# Patient Record
(demographics unavailable — no encounter records)

---

## 2024-11-01 NOTE — PHYSICAL EXAM
[Right] : right shoulder [Left] : left shoulder [Sitting] : sitting [] : no AC joint tenderness [FreeTextEntry9] : IR to lateral hip. [TWNoteComboBox4] : passive forward flexion 150 degrees [TWNoteComboBox6] : internal rotation L2 [de-identified] : external rotation 60 degrees

## 2024-11-01 NOTE — ASSESSMENT
[FreeTextEntry1] : We discussed the underlying pathology. Treatment options reviewed. Medication use discussed. PT is planned. A R SH injection is elected today. Cautions discussed. If sx persist, consider MRI. Questions answered. Follow up 6 weeks.  Patient seen by Dr. Gordy Dill, who determined the assessment and plan Caitie BEE, am scribing for Dr. Gordy Dill in his presence for the chief complaint, physical exam, studies, assessment, and/or plan.  Procedure Name: Large Joint Injection / Aspiration: Depomedrol, Lidocaine and Guidance Ultrasound   Large Joint Injection was performed because of pain and inflammation. Depomedrol: An injection of Depomedrol 40 mg/cc, 2 cc. Lidocaine: An injection of Lidocaine 1 mg/cc, 13 cc.   Medication was injected in the right subacromial space and glenohumeral joint from a posterior approach. Patient has tried OTC's including aspirin, Ibuprofen, Aleve etc or prescription NSAIDS, and/or exercises at home and/ or physical therapy without satisfactory response. The risks, benefits, and alternatives to steroid injection were explained in full to the patient. Risks outlined include but are not limited to infection, sepsis, bleeding, scarring, skin discoloration, temporary increase in pain, syncopal episode, failure to resolve symptoms, allergic reaction, symptom recurrence, and elevation of blood sugar in diabetics. Patient understood the risks. All questions were answered. After discussion, patient requested an injection. Oral informed consent was obtained.  Sterile preparation with betadine and aseptic technique was utilized for the procedure, including the preparation of the solutions used for the injection. Patient tolerated the procedure well.  Post Procedure Instructions: Patient was advised to call if redness, pain, or fever occur and apply ice for 15 min. out of every hour for the next 12-24 hours as tolerated. Patient was advised to rest the joint(s) for 3 days.  Advised to ice the injection site this evening. Ultrasound Guidance was used for the following reasons: for precise injection in area of tear. Visualization of the needle and placement of injection was performed without complication.

## 2024-11-01 NOTE — REASON FOR VISIT
[FreeTextEntry2] : This is a 69 year old male, RHD, retired , presenting with rt shoulder pain since  Aug 2024. He reports soreness in the right arm that started with neck pain that radiated down. No n/t. He reports having trouble reaching and with ROM. The pain affects his sleep when he lays on his right side. He reports the pain and ROM has gotten better since August but he still has stiffness.

## 2024-12-13 NOTE — PHYSICAL EXAM
[Right] : right shoulder [Left] : left shoulder [Sitting] : sitting [Standing] : standing [Trace] : trace [5 ___] : forward flexion 5[unfilled]/5 [] : no sensory deficits [TWNoteComboBox4] : passive forward flexion 150 degrees [TWNoteComboBox6] : internal rotation L2 [de-identified] : external rotation 60 degrees

## 2024-12-13 NOTE — HISTORY OF PRESENT ILLNESS
[Retired] : Work status: retired [de-identified] : rt Shoulder Follow up m doing PT a little better  [] : Post Surgical Visit: no

## 2024-12-13 NOTE — REASON FOR VISIT
[FreeTextEntry2] : This is a 69 year old male, RHD, retired , presenting with rt shoulder pain since  Aug 2024. He reports soreness in the right arm that started with neck pain that radiated down. No n/t. He reports having trouble reaching and with ROM. The pain affects his sleep when he lays on his right side. He reports the pain and ROM has gotten better since August but he still has stiffness.  The SA/GH injection helped a great deal.  He did 6 sessions of PT, though it was financially a struggle.  Overall, he feels much better about 90%.  He was able to hang up [employer] decorations with no trouble.

## 2024-12-13 NOTE — ASSESSMENT
[FreeTextEntry1] : He feels 90% better. HEP will continue. Questions addressed. Follow up will be at his request.  Patient seen by Dr. Gordy Dill, who determined the assessment and plan. Addie DUKES participated in the care of the patient, including the history and physical exam. ROHIT, Caitie Dickson, am scribing for Dr. Gordy Dill in his presence for the chief complaint, physical exam, studies, assessment, and/or plan.

## 2025-01-23 NOTE — REASON FOR VISIT
[Follow-Up Visit] : a follow-up [Family Member] : family member [FreeTextEntry2] : lung adenocarcinoma

## 2025-01-23 NOTE — PHYSICAL EXAM
[Restricted in physically strenuous activity but ambulatory and able to carry out work of a light or sedentary nature] : Status 1- Restricted in physically strenuous activity but ambulatory and able to carry out work of a light or sedentary nature, e.g., light house work, office work [Normal] : affect appropriate [de-identified] : dysphonia [de-identified] : supple [de-identified] : no wheezing, no rhonchi

## 2025-01-23 NOTE — RESULTS/DATA
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

## 2025-01-23 NOTE — HISTORY OF PRESENT ILLNESS
[Disease: _____________________] : Disease: [unfilled] [AJCC Stage: ____] : AJCC Stage: [unfilled] [de-identified] : This patient is a 68 yo gentleman, recent smoker (3 cigarettes per day x 40 years, quit in Feb 20230), with no significant PMH who presents due to abnormal lung findings during evaluation for voice hoarseness.  Patient started to lose his voice in Nov 2022.   1/4/23:  CT Head w/ con- Normal study. CT soft tissue neck w/ con- Medially deviated left vocal fold associated with left aryepiglottic fold thickening and dilatation of the left piriformis sinus.  No evidence for a mass/ pathology in the expected course of the recurrent laryngeal nerve.  No pathologically enlarged LN or masses.  CT chest w/ con: Since chest CT of 2/22/2018, given differences in technique: New abnormal lobulated 4.5 cm soft tissue in the aortopulmonary window of the mediastinum with heterogeneous enhancement concerning for necrotic matted lymphadenopathy or mass. Findings likely resulting in L recurrent laryngeal nerve compromise given the vocal cord paralysis.  New lateral CARLOS 0.7cm nodule, neoplastic/mets cannot be excluded. Chronic severe centrilobular emphysematous changes. 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  1/30/23: Patient sees Dr. Owusu from ENT. He had biopsy of R vocal fold lesion.  If cancer identified, planned for OR for excision.  Path R vocal fold lesion- minute superficial strips of squamous epithelium and keratin with bacterial colonies, suboptimal for histological evaluation. Clinical correlation.   2/3/23: PET/CT 1. Peripherally FDG avid mass in the aortopulmonary window with central necrosis concerning for malignancy. Histologic correlation is recommended. SUV 14.2 2. Minimally FDG-avid cardiophrenic soft tissue nodule, just to the right of midline, is indeterminate. SUV 1.9 3. A minimally avid 0.7 cm left upper lobe pulmonary nodule, unchanged as compared to CT dated 1/4/2023, also is indeterminate. (SUV 0.7) 4. Medialization of the left vocal cord with asymmetric decreased FDG avidity, likely secondary to vocal cord paralysis. 5. Small cutaneous focus of mild FDG activity, left inguinal region, without corresponding abnormality on CT, is indeterminate. Please correlate with direct visualization. In the absence of a corresponding abnormality, this may represent a focus of urinary contamination.  2/17/23: Patient underwent EBUS, transbronchial needle aspiration, cervical video mediastinoscopy and LN dissection, extended cervical mediastinoscopy, biopsy of anterior mediastinal LN and biopsy of level six LN.     LN- L4- EBUS guided FNA- negative for malignant cells, favor reactive LN.    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 Extranodal extension is seen IHC is positive for TTF-1 and therea re some scattered cells positive for p40. GATA3 positive. CDX2 and NKX3.1 are negative. Immunoreactivity for TTF-1 support the tumor of lung primary, PAX8 pending. PD-L1 TPS 75%, POSITIVE  IHC negative for PAX8  2/28/23: Patient visited Dr. Thrasher (CT surgery). Patient was recommended to see medical oncology clinic.   PMH: COPD, atherosclerosis PSH: appendectomy Allergies: NKDA Medications: None Family Hx: No family hx of cancer Social Hx: Lives independently, worked up to last Friday (- chemical exposure), 1-2 servings alcohol per month, quit smoking few weeks ago, smoked about 20 years - < 1/2 ppd, no drug use, Patient has a girlfriend, they live in [place]  PMD: Dr. Hinds ([facility])   Patient was previously working nights. Last Thursday was the last day of work. He reports fatigue, cough productive of clear sputum, and shortness of breath. He has not seen a pulmonologist before. He endorses 18 lbs of weight loss since January 2023. He denies any pain.  He also complains of nausea and vomiting several times a week.   3/15/23: Pt here for follow up. No new complaints  3/24/23: Pt seen today in treatment room receiving C2 weekly carbo/taxol, accompanied by his son. He reports that after his first cycle last Thursday, he developed vomiting on Sunday that lasted until Wednesday. Initially he was not taking Reglan but then started to with some relief. His appetite was poor the first few days following treatment but has improved. He also notes that he has not had a BM since saturday morning but is passing gas, no abdominal pain.  He also notes increased SOB with exertion for the last few days since throwing up. No chest pain, fever, chills.   3/31/23: Pt seen today in treatment room accompanied by his daughter receiving C3 weekly carbo/taxol. He reports nausea for a few days following treatment but controlled with reglan and maintaining PO intake. His breathing has improved and he is able to walk further distances without getting SOB. His main issue at this time is constipation. He has not had a BM since 5 days ago and has started using senna/docusate yesterday but no BM yet.   4/7/23: patient seen in treatment room today accompanied by his girlfriend. He reports that he had nausea after his last treatment and threw up once, but started using reglan which helped. He started using senna and docusate and has been having BMs every other day now. He does note loose stools yesterday so will cut back on bowel regimen if this persists. His breathing is stable.   4/28/23: dysneic  6/2/23: Completed RT on 6/6. Tolerated well. Did not receive chemo during RT. pt states he has stable symtpoms of dyspnea, dysphonia, and some cough. He has maintained stable weight. He had an episode of chest, upper left abd pain- which resolved spontaneously. Took Tylenol with some relief.   6/23/23: Patient seen today for follow up prior to starting Durvalumab. Of note, he was ordered for CTA Chest after his last visit however we received a call from [facility] where he went to have the scan done saying that he could not lay flat for the exam due to SOB. Upon talking to patient he reported that he was having SOB when laying down and sometimes when ambulating. Encouraged him to go to the hospital for further evaluation but he did not want to at that time thus VQ scan was ordered. Today he informed us that he ended up going to [facility] for SOB where they treated him with nebs and steroids, reports that they did CT scan and did not find PE and also did US of his heart. He feels his breathing has improved since his hospitalization. He mentions his feet became swollen while in the hospital and had improved but today is swollen again. He also reports that he was discharged on prednisone taper but stopped taking it because he had an episode where he was walking and then felt his body shaking for 20 seconds, resolved on its own, and feels it was related to the prednisone as a similar event occurred when he took prednisone prior.     7/21/23: Patient presents for follow up. Patient respiratory symptoms continue at baseline, denies SOB at rest, has MANDEL. Globus sensation and coughing after eating/drinking continue without change since treatment. Patient states he experiences lightheadedness when he bends over. Has a wound on his chest appx 1" circular lesion with exudate and on left 5th digit which started appx 2-3 weeks, states saw PCP 7/15/2023 and was prescribed antibiotic ointment, which does not seem to have helped. He also has a nodule on the left little finger. LE swelling that comes and goes. 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 He suggested follow up with Dr. Owusu for L TVF injection for phonation, as well as for fup for R TVF polyp.Pt states voice is better. He has itching, but no rash. He denies any other irAES. He has a few nodular lesions on hands and chest which are healing.   9/15/23: Pt has some haorse voice, he had injection yesterday. otherwise asymptomatic. Some fatigue.   1/5/2024: Patient seen in treatment room with son, continues on durvalumab, has hoarse voice and  reports morning congestion, cough and sob following cough which occurs 2-3x/week. Denies fever, chill, abd pain, bowel changes, headaches.   2/2/24: Pt seen in follow up. Pt reports he is feeling well. Occasional MANDEL which resolves spontaneously at rest. Tolerating durvalumab wo irAE  3/1/24: Seen for follow up. No complaints. excess mucus at night,   3/29/24: Patient seen today for follow up. He reports ongoing occasional MANDEL, unchanged from prior. Otherwise feeling well   4/26/24: Patient seen today for follow up accompanied by his son. He was very upset because of the timing of his appointments. He reports ongoing MANDEL that is unchanged. He also mentions constipation x2 weeks, has been using ex-lax with some relief. He is also upset that he needs CT scans again.   5/24/24: Patient seen today for follow up accompanied by his son and his girlfriend joined the visit via telephone. He reports feeling overall well. Breathing is stable. Denies F/C/N/V/D, rash.   1/23/25: Doing well. Weight and appetite good.  [de-identified] : adeno ca [FreeTextEntry1] : weekly carbo/taxol  3/16/23 - 4/23/23, thoracic RT, now on Imfinzi

## 2025-01-23 NOTE — REVIEW OF SYSTEMS
[Fever] : no fever [Chills] : no chills [Night Sweats] : no night sweats [Recent Change In Weight] : ~T no recent weight change [Dysphagia] : no dysphagia [Hoarseness] : hoarseness [Mucosal Pain] : no mucosal pain [Chest Pain] : no chest pain [Palpitations] : no palpitations [Lower Ext Edema] : no lower extremity edema [Shortness Of Breath] : shortness of breath [Wheezing] : no wheezing [SOB on Exertion] : shortness of breath during exertion [Abdominal Pain] : no abdominal pain [Constipation] : constipation [Joint Pain] : no joint pain [Muscle Pain] : no muscle pain [Skin Rash] : no skin rash [Negative] : Allergic/Immunologic

## 2025-01-23 NOTE — ASSESSMENT
[FreeTextEntry1] : Patient started to develop voice hoarseness in Nov 2022. He visited Dr. Pedersen from ENT, and CT imaging as noted above which identified 5.6 soft tissue in aortopulmonary window of mediastinum (likely causing L reucrrent laryngeal nerve paralysis), New CARLOS 0.7cm nodule, and emphysema. R vocal fold biopsy found squamous epithelium. Pathology from L6 nodule found lung adenocarcinoma, PD-L1 TPS 75% positive. Foundation1 found no actionable mutations (full report in Results section). PETCT identified FDG avid mass in the aortopulmonary window (SUV 14.2), minimally FDG-avid cardiophrenic soft tissue nodule, just to the right of midline, is indeterminate. SUV 1.9, and a minimally avid 0.7 cm left upper lobe pulmonary nodule, unchanged as compared to CT dated 1/4/2023, also is indeterminate. (SUV 0.7). Small cutaneous focus of mild FDG activity, left inguinal region, without corresponding abnormality on CT, is indeterminate, without clinical correlate. Discussed case at thoracic TB- stage is at least IIIA and not resectable due to poor lung function, location of tumor and N2 jaqui status. Consensus was for CRT (pending brain MRI). Patient may have stage IIIc disease, if contralateral node is pos (T1 N3 Mx). However, this will not change the recommendation. We discussed with the patient that standard of care for this patient will be a regimen of concurrent chemotherapy and radiation with carboplatin and paclitaxel followed by interval imaging and then immunotherapy (based on PACIFIC trial) He began treatment with weekly carbo/taxol on 3/16/23. Pt was tolerating but had increasing fatigue, weakness, and dyspnea on exertion after 6 cycles. Scans done in May showed excellent [place]. Plan was to proceed with concurrent chemo and RT but pt expressed understanding and confirmed that he wants to proceed with RT alone Pt completed RT and he has started duvalumab maintenance 06/2023 with plan to continue for one year.  Most recent PET-CT from May 2024 showed continued response  Pt lost to follow up and returns today for a follow up Will get CT neck, chest, Abd and pelvis MR head Continue to follow up ENT for hoarseness of voice.  OV in 6 weeks Labs today Educated pt about compliance with appts and scans.

## 2025-03-06 NOTE — RESULTS/DATA
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

## 2025-03-06 NOTE — REVIEW OF SYSTEMS
[Hoarseness] : hoarseness [Shortness Of Breath] : shortness of breath [SOB on Exertion] : shortness of breath during exertion [Constipation] : constipation [Negative] : Allergic/Immunologic [Fever] : no fever [Chills] : no chills [Night Sweats] : no night sweats [Recent Change In Weight] : ~T no recent weight change [Dysphagia] : no dysphagia [Mucosal Pain] : no mucosal pain [Chest Pain] : no chest pain [Palpitations] : no palpitations [Lower Ext Edema] : no lower extremity edema [Wheezing] : no wheezing [Abdominal Pain] : no abdominal pain [Joint Pain] : no joint pain [Muscle Pain] : no muscle pain [Skin Rash] : no skin rash

## 2025-03-06 NOTE — PHYSICAL EXAM
[Restricted in physically strenuous activity but ambulatory and able to carry out work of a light or sedentary nature] : Status 1- Restricted in physically strenuous activity but ambulatory and able to carry out work of a light or sedentary nature, e.g., light house work, office work [Normal] : affect appropriate [de-identified] : dysphonia [de-identified] : supple [de-identified] : no wheezing, no rhonchi

## 2025-03-06 NOTE — ASSESSMENT
[FreeTextEntry1] : Patient started to develop voice hoarseness in Nov 2022. He visited Dr. Pedersen from ENT, and CT imaging as noted above which identified 5.6 soft tissue in aortopulmonary window of mediastinum (likely causing L reucrrent laryngeal nerve paralysis), New CARLOS 0.7cm nodule, and emphysema. R vocal fold biopsy found squamous epithelium. Pathology from L6 nodule found lung adenocarcinoma, PD-L1 TPS 75% positive. Foundation1 found no actionable mutations (full report in Results section). PETCT identified FDG avid mass in the aortopulmonary window (SUV 14.2), minimally FDG-avid cardiophrenic soft tissue nodule, just to the right of midline, is indeterminate. SUV 1.9, and a minimally avid 0.7 cm left upper lobe pulmonary nodule, unchanged as compared to CT dated 1/4/2023, also is indeterminate. (SUV 0.7). Small cutaneous focus of mild FDG activity, left inguinal region, without corresponding abnormality on CT, is indeterminate, without clinical correlate. Discussed case at thoracic TB- stage is at least IIIA and not resectable due to poor lung function, location of tumor and N2 jaqui status. Consensus was for CRT (pending brain MRI). Patient may have stage IIIc disease, if contralateral node is pos (T1 N3 Mx). However, this will not change the recommendation. We discussed with the patient that standard of care for this patient will be a regimen of concurrent chemotherapy and radiation with carboplatin and paclitaxel followed by interval imaging and then immunotherapy (based on PACIFIC trial) He began treatment with weekly carbo/taxol on 3/16/23. Pt was tolerating but had increasing fatigue, weakness, and dyspnea on exertion after 6 cycles. Scans done in May showed excellent [place]. Plan was to proceed with concurrent chemo and RT but pt expressed understanding and confirmed that he wants to proceed with RT alone Pt completed RT and he has started duvalumab maintenance 06/2023 with plan to continue for one year.  Most recent PET-CT from May 2024 showed continued response  Pt lost to follow up and returns today for a follow up CT chest/abd/pelvis JASVIR MR head No evidence of metastatic disease in the brain. Mild chronic microvascular ischemic changes, similar to prior exam in 2023. Continue to follow up ENT for hoarseness of voice.  Labs from Jan WNL. slightly elevated K- discussed diet., Pt stated he likes to eat bananas OV in 6 months Labs today Educated pt about compliance with appts and scans.  Still smoking about 3 cigarettes/week. Discussed complete cessation. Pt declined referral to tobacco cessation.

## 2025-03-06 NOTE — HISTORY OF PRESENT ILLNESS
[Disease: _____________________] : Disease: [unfilled] [AJCC Stage: ____] : AJCC Stage: [unfilled] [de-identified] : This patient is a 71 yo gentleman, recent smoker (3 cigarettes per day x 40 years, quit in Feb 20230), with no significant PMH who presents due to abnormal lung findings during evaluation for voice hoarseness.  Patient started to lose his voice in Nov 2022.   1/4/23:  CT Head w/ con- Normal study. CT soft tissue neck w/ con- Medially deviated left vocal fold associated with left aryepiglottic fold thickening and dilatation of the left piriformis sinus.  No evidence for a mass/ pathology in the expected course of the recurrent laryngeal nerve.  No pathologically enlarged LN or masses.  CT chest w/ con: Since chest CT of 2/22/2018, given differences in technique: New abnormal lobulated 4.5 cm soft tissue in the aortopulmonary window of the mediastinum with heterogeneous enhancement concerning for necrotic matted lymphadenopathy or mass. Findings likely resulting in L recurrent laryngeal nerve compromise given the vocal cord paralysis.  New lateral CARLOS 0.7cm nodule, neoplastic/mets cannot be excluded. Chronic severe centrilobular emphysematous changes. 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  1/30/23: Patient sees Dr. Owusu from ENT. He had biopsy of R vocal fold lesion.  If cancer identified, planned for OR for excision.  Path R vocal fold lesion- minute superficial strips of squamous epithelium and keratin with bacterial colonies, suboptimal for histological evaluation. Clinical correlation.   2/3/23: PET/CT 1. Peripherally FDG avid mass in the aortopulmonary window with central necrosis concerning for malignancy. Histologic correlation is recommended. SUV 14.2 2. Minimally FDG-avid cardiophrenic soft tissue nodule, just to the right of midline, is indeterminate. SUV 1.9 3. A minimally avid 0.7 cm left upper lobe pulmonary nodule, unchanged as compared to CT dated 1/4/2023, also is indeterminate. (SUV 0.7) 4. Medialization of the left vocal cord with asymmetric decreased FDG avidity, likely secondary to vocal cord paralysis. 5. Small cutaneous focus of mild FDG activity, left inguinal region, without corresponding abnormality on CT, is indeterminate. Please correlate with direct visualization. In the absence of a corresponding abnormality, this may represent a focus of urinary contamination.  2/17/23: Patient underwent EBUS, transbronchial needle aspiration, cervical video mediastinoscopy and LN dissection, extended cervical mediastinoscopy, biopsy of anterior mediastinal LN and biopsy of level six LN.     LN- L4- EBUS guided FNA- negative for malignant cells, favor reactive LN.    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 Extranodal extension is seen IHC is positive for TTF-1 and therea re some scattered cells positive for p40. GATA3 positive. CDX2 and NKX3.1 are negative. Immunoreactivity for TTF-1 support the tumor of lung primary, PAX8 pending. PD-L1 TPS 75%, POSITIVE  IHC negative for PAX8  2/28/23: Patient visited Dr. Thrasher (CT surgery). Patient was recommended to see medical oncology clinic.   PMH: COPD, atherosclerosis PSH: appendectomy Allergies: NKDA Medications: None Family Hx: No family hx of cancer Social Hx: Lives independently, worked up to last Friday (- chemical exposure), 1-2 servings alcohol per month, quit smoking few weeks ago, smoked about 20 years - < 1/2 ppd, no drug use, Patient has a girlfriend, they live in [place]  PMD: Dr. Hinds ([facility])   Patient was previously working nights. Last Thursday was the last day of work. He reports fatigue, cough productive of clear sputum, and shortness of breath. He has not seen a pulmonologist before. He endorses 18 lbs of weight loss since January 2023. He denies any pain.  He also complains of nausea and vomiting several times a week.   3/15/23: Pt here for follow up. No new complaints  3/24/23: Pt seen today in treatment room receiving C2 weekly carbo/taxol, accompanied by his son. He reports that after his first cycle last Thursday, he developed vomiting on Sunday that lasted until Wednesday. Initially he was not taking Reglan but then started to with some relief. His appetite was poor the first few days following treatment but has improved. He also notes that he has not had a BM since saturday morning but is passing gas, no abdominal pain.  He also notes increased SOB with exertion for the last few days since throwing up. No chest pain, fever, chills.   3/31/23: Pt seen today in treatment room accompanied by his daughter receiving C3 weekly carbo/taxol. He reports nausea for a few days following treatment but controlled with reglan and maintaining PO intake. His breathing has improved and he is able to walk further distances without getting SOB. His main issue at this time is constipation. He has not had a BM since 5 days ago and has started using senna/docusate yesterday but no BM yet.   4/7/23: patient seen in treatment room today accompanied by his girlfriend. He reports that he had nausea after his last treatment and threw up once, but started using reglan which helped. He started using senna and docusate and has been having BMs every other day now. He does note loose stools yesterday so will cut back on bowel regimen if this persists. His breathing is stable.   4/28/23: dysneic  6/2/23: Completed RT on 6/6. Tolerated well. Did not receive chemo during RT. pt states he has stable symtpoms of dyspnea, dysphonia, and some cough. He has maintained stable weight. He had an episode of chest, upper left abd pain- which resolved spontaneously. Took Tylenol with some relief.   6/23/23: Patient seen today for follow up prior to starting Durvalumab. Of note, he was ordered for CTA Chest after his last visit however we received a call from [facility] where he went to have the scan done saying that he could not lay flat for the exam due to SOB. Upon talking to patient he reported that he was having SOB when laying down and sometimes when ambulating. Encouraged him to go to the hospital for further evaluation but he did not want to at that time thus VQ scan was ordered. Today he informed us that he ended up going to [facility] for SOB where they treated him with nebs and steroids, reports that they did CT scan and did not find PE and also did US of his heart. He feels his breathing has improved since his hospitalization. He mentions his feet became swollen while in the hospital and had improved but today is swollen again. He also reports that he was discharged on prednisone taper but stopped taking it because he had an episode where he was walking and then felt his body shaking for 20 seconds, resolved on its own, and feels it was related to the prednisone as a similar event occurred when he took prednisone prior.     7/21/23: Patient presents for follow up. Patient respiratory symptoms continue at baseline, denies SOB at rest, has MANDEL. Globus sensation and coughing after eating/drinking continue without change since treatment. Patient states he experiences lightheadedness when he bends over. Has a wound on his chest appx 1" circular lesion with exudate and on left 5th digit which started appx 2-3 weeks, states saw PCP 7/15/2023 and was prescribed antibiotic ointment, which does not seem to have helped. He also has a nodule on the left little finger. LE swelling that comes and goes. 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 He suggested follow up with Dr. Owusu for L TVF injection for phonation, as well as for fup for R TVF polyp.Pt states voice is better. He has itching, but no rash. He denies any other irAES. He has a few nodular lesions on hands and chest which are healing.   9/15/23: Pt has some haorse voice, he had injection yesterday. otherwise asymptomatic. Some fatigue.   1/5/2024: Patient seen in treatment room with son, continues on durvalumab, has hoarse voice and  reports morning congestion, cough and sob following cough which occurs 2-3x/week. Denies fever, chill, abd pain, bowel changes, headaches.   2/2/24: Pt seen in follow up. Pt reports he is feeling well. Occasional MANDEL which resolves spontaneously at rest. Tolerating durvalumab wo irAE  3/1/24: Seen for follow up. No complaints. excess mucus at night,   3/29/24: Patient seen today for follow up. He reports ongoing occasional MANDEL, unchanged from prior. Otherwise feeling well   4/26/24: Patient seen today for follow up accompanied by his son. He was very upset because of the timing of his appointments. He reports ongoing MANDEL that is unchanged. He also mentions constipation x2 weeks, has been using ex-lax with some relief. He is also upset that he needs CT scans again.   5/24/24: Patient seen today for follow up accompanied by his son and his girlfriend joined the visit via telephone. He reports feeling overall well. Breathing is stable. Denies F/C/N/V/D, rash.   1/23/25: Doing well. Weight and appetite good.   3/6/25: Doing well. Appetite good, Weight stable. Had some episodes of dyspnea in the past, which was treated as COPD exacerbation. Feels OK now. He has not had any recent exacerbations.  [de-identified] : adeno ca [FreeTextEntry1] : weekly carbo/taxol  3/16/23 - 4/23/23, thoracic RT, now on Imfinzi

## 2025-03-14 NOTE — REASON FOR VISIT
[Subsequent Evaluation] : a subsequent evaluation for [FreeTextEntry2] :  dysphonia, left vocal fold immobility, right vocal fold polyp

## 2025-03-14 NOTE — PROCEDURE
[de-identified] : Stroboscopic Laryngoscopy Procedure Note:  Indication:	Assess laryngeal biomechanics and vocal fold oscillation.  Description of Procedure:	Informed consent was verbally obtained from the patient prior to the procedure. The patient was seated in the clinic chair. Topical anesthesia was achieved by first spraying the nasal cavities with 4% lidocaine and nasal decongestant.   Findings:  Supraglottis: no masses or lesions  Glottis:    Structure:                        Right: crisp and shows no lesions or masses                        Left:  crisp and shows no lesions or masses, bowed                Mobility:                        Right:  normal                        Left:  immobole, paramedian                Amplitude:                        Right:  normal                       Left:  increased                Closure: complete                 Wave symmetry:  symmetric  Subglottis: no masses or lesions within the visualized subglottis Visualized airway is widely patent.

## 2025-03-14 NOTE — ASSESSMENT
[FreeTextEntry1] : Assessment/Plan: #1 Dysphonia #2 Left sided vocal fold immobility #3 Hx of lung cancer   I have recommended we proceed with a CT neck with contrast.  We will discuss these results once completed.  This will help rule out if there is a mass/cancer that is causing this vocal fold paralysis.   I have given them the following options at this time: 1) Observation 2) Voice Therapy 3) Injection laryngoplasty with Juvederm or Restylane 4) Left sided medialization thyroplasty with silastic   The risks, benefits, and alternatives to care were discussed and understanding expressed.  The purpose of this injection is primarily to improve their voice.   The patient elected to proceed forward with option #1.

## 2025-03-14 NOTE — HISTORY OF PRESENT ILLNESS
[de-identified] :  BASILIO KEBEDE is a 68 year old male who presents to the [facility] for follow up of his dysphonia, left vocal fold immobility, right vocal fold polyp, and mediastinal adenocarcinoma treated with chemo RT in 2023. I last saw the patient on 9/14/23. He was to follow up in 1mth. States he continues to have voice hoarseness, low toned and excessive phlegm in throat with frequent throat clearing.  States after last injection laryngoplasty, he noticed great improvement with voice.  No longer smoking.  Has hx of COPD- using inhalers PRN  Denies pain while speaking, complete loss of voice, globus sensation, throat pain, neck swelling, cough, hemoptysis, dysphagia, pain while swallowing, fevers or recent throat infections.   Path (1/30/23): Minute superficial strips of squamous epithelium, and keratin with bacterial colonies, suboptimal for histological evaluation  Previously reported: left vocal fold paralysis and presumed right vocal fold polyp. Pt reports losing his voice starting Nov 2022. CT neck/chest done 1/4/2023. Pt states the voice fluctuates throughout the day. Has to exert more energy to get the voice out. Pt currently smoking 3 cig/day. Smoking for 40 yrs. Reports cutting down on cigarette smoking. Reports 1 cigarette 1 week ago. Occasional shortness of breath-mostly related to COPD. Patient is being followed by Dr. Pedersen and has been scheduled for lung biopsy and appointments with rad and rad-onc. Reports no change in symptoms since last seeing Dr. Pedersen.  CT SOFT TISSUE NECK WITH CONTRAST 01/04/2023 IMPRESSION: Medially deviated left vocal fold associated with left aryepiglottic fold thickening and dilatation of the left piriform sinus. No evidence for a mass/pathology in the expected course of the recurrent laryngeal nerve.  CT Chest: 4.5 cm x 3.8 cm mass in the aortopulmonary window, left lung, necrotic matted, concerning for malignancy.  Prior Pertinent Procedures: 1/30/23: In office right vocal fold lesion biopsy, left injection laryngoplasty with Juvederm; Dr. Owusu 9/14/23: In office left sided injection laryngoplasty with Juvederm; Dr. Owusu

## 2025-03-14 NOTE — HISTORY OF PRESENT ILLNESS
[de-identified] :  BASILIO KEBEDE is a 68 year old male who presents to the [facility] for follow up of his dysphonia, left vocal fold immobility, right vocal fold polyp, and mediastinal adenocarcinoma treated with chemo RT in 2023. I last saw the patient on 9/14/23. He was to follow up in 1mth. States he continues to have voice hoarseness, low toned and excessive phlegm in throat with frequent throat clearing.  States after last injection laryngoplasty, he noticed great improvement with voice.  No longer smoking.  Has hx of COPD- using inhalers PRN  Denies pain while speaking, complete loss of voice, globus sensation, throat pain, neck swelling, cough, hemoptysis, dysphagia, pain while swallowing, fevers or recent throat infections.   Path (1/30/23): Minute superficial strips of squamous epithelium, and keratin with bacterial colonies, suboptimal for histological evaluation  Previously reported: left vocal fold paralysis and presumed right vocal fold polyp. Pt reports losing his voice starting Nov 2022. CT neck/chest done 1/4/2023. Pt states the voice fluctuates throughout the day. Has to exert more energy to get the voice out. Pt currently smoking 3 cig/day. Smoking for 40 yrs. Reports cutting down on cigarette smoking. Reports 1 cigarette 1 week ago. Occasional shortness of breath-mostly related to COPD. Patient is being followed by Dr. Pedersen and has been scheduled for lung biopsy and appointments with rad and rad-onc. Reports no change in symptoms since last seeing Dr. Pedersen.  CT SOFT TISSUE NECK WITH CONTRAST 01/04/2023 IMPRESSION: Medially deviated left vocal fold associated with left aryepiglottic fold thickening and dilatation of the left piriform sinus. No evidence for a mass/pathology in the expected course of the recurrent laryngeal nerve.  CT Chest: 4.5 cm x 3.8 cm mass in the aortopulmonary window, left lung, necrotic matted, concerning for malignancy.  Prior Pertinent Procedures: 1/30/23: In office right vocal fold lesion biopsy, left injection laryngoplasty with Juvederm; Dr. Owusu 9/14/23: In office left sided injection laryngoplasty with Juvederm; Dr. Owusu

## 2025-03-14 NOTE — PROCEDURE
[de-identified] : Stroboscopic Laryngoscopy Procedure Note:  Indication:	Assess laryngeal biomechanics and vocal fold oscillation.  Description of Procedure:	Informed consent was verbally obtained from the patient prior to the procedure. The patient was seated in the clinic chair. Topical anesthesia was achieved by first spraying the nasal cavities with 4% lidocaine and nasal decongestant.   Findings:  Supraglottis: no masses or lesions  Glottis:    Structure:                        Right: crisp and shows no lesions or masses                        Left:  crisp and shows no lesions or masses, bowed                Mobility:                        Right:  normal                        Left:  immobole, paramedian                Amplitude:                        Right:  normal                       Left:  increased                Closure: complete                 Wave symmetry:  symmetric  Subglottis: no masses or lesions within the visualized subglottis Visualized airway is widely patent.